# Patient Record
Sex: FEMALE | URBAN - METROPOLITAN AREA
[De-identification: names, ages, dates, MRNs, and addresses within clinical notes are randomized per-mention and may not be internally consistent; named-entity substitution may affect disease eponyms.]

---

## 2023-02-14 ENCOUNTER — TELEPHONE ENCOUNTER (OUTPATIENT)
Dept: URBAN - METROPOLITAN AREA CLINIC 113 | Facility: CLINIC | Age: 48
End: 2023-02-14

## 2023-03-13 ENCOUNTER — OFFICE VISIT (OUTPATIENT)
Dept: URBAN - METROPOLITAN AREA CLINIC 113 | Facility: CLINIC | Age: 48
End: 2023-03-13
Payer: COMMERCIAL

## 2023-03-13 VITALS
RESPIRATION RATE: 20 BRPM | SYSTOLIC BLOOD PRESSURE: 119 MMHG | TEMPERATURE: 97.7 F | HEART RATE: 77 BPM | BODY MASS INDEX: 29.3 KG/M2 | WEIGHT: 159.2 LBS | HEIGHT: 62 IN | DIASTOLIC BLOOD PRESSURE: 76 MMHG

## 2023-03-13 DIAGNOSIS — Z12.11 SCREENING FOR COLON CANCER: ICD-10-CM

## 2023-03-13 DIAGNOSIS — K59.01 SLOW TRANSIT CONSTIPATION: ICD-10-CM

## 2023-03-13 PROBLEM — 35298007 SLOW TRANSIT CONSTIPATION: Status: ACTIVE | Noted: 2023-03-13

## 2023-03-13 PROCEDURE — 99244 OFF/OP CNSLTJ NEW/EST MOD 40: CPT | Performed by: INTERNAL MEDICINE

## 2023-03-13 PROCEDURE — 99204 OFFICE O/P NEW MOD 45 MIN: CPT | Performed by: INTERNAL MEDICINE

## 2023-03-13 NOTE — HPI-TODAY'S VISIT:
46yo female referred by Dr. Ashley Olivas for evaluation of a change in bowel habits.  A copy of this document is being forwarded to the referring provider.  She complains of chronic constipation which has been worsening over the last few months. She reports no change with Benefiber, enemas, or mineral oil. MiraLAX has been helpful in the past, but for some reason she was instructed to discontinue it. Some weeks, she has a daily bowel movement. Other times, she will go several days between stools. She denies abdominal pain, nausea, vomiting, or blood per rectum. She has never had a colonoscopy. There is no family history of GI malignancy.

## 2023-04-06 ENCOUNTER — OFFICE VISIT (OUTPATIENT)
Dept: URBAN - METROPOLITAN AREA SURGERY CENTER 25 | Facility: SURGERY CENTER | Age: 48
End: 2023-04-06
Payer: COMMERCIAL

## 2023-04-06 DIAGNOSIS — Z12.11 SCREENING FOR COLON CANCER: ICD-10-CM

## 2023-04-06 PROCEDURE — 45378 DIAGNOSTIC COLONOSCOPY: CPT | Performed by: INTERNAL MEDICINE

## 2023-04-06 PROCEDURE — G8907 PT DOC NO EVENTS ON DISCHARG: HCPCS | Performed by: INTERNAL MEDICINE

## 2023-05-04 ENCOUNTER — OFFICE VISIT (OUTPATIENT)
Dept: URBAN - METROPOLITAN AREA CLINIC 113 | Facility: CLINIC | Age: 48
End: 2023-05-04
Payer: COMMERCIAL

## 2023-05-04 ENCOUNTER — DASHBOARD ENCOUNTERS (OUTPATIENT)
Age: 48
End: 2023-05-04

## 2023-05-04 VITALS
SYSTOLIC BLOOD PRESSURE: 117 MMHG | WEIGHT: 157.4 LBS | DIASTOLIC BLOOD PRESSURE: 65 MMHG | HEIGHT: 62 IN | BODY MASS INDEX: 28.97 KG/M2 | TEMPERATURE: 97.5 F | HEART RATE: 71 BPM | RESPIRATION RATE: 18 BRPM

## 2023-05-04 DIAGNOSIS — K59.01 SLOW TRANSIT CONSTIPATION: ICD-10-CM

## 2023-05-04 DIAGNOSIS — K64.8 INTERNAL HEMORRHOIDS: ICD-10-CM

## 2023-05-04 PROCEDURE — 99213 OFFICE O/P EST LOW 20 MIN: CPT | Performed by: NURSE PRACTITIONER

## 2023-05-04 NOTE — HPI-TODAY'S VISIT:
46yo female presenting for follow-up after colonoscopy. She was seen in the office in March for evaluation of chronic constipation, refractory to fiber supplementation. She was encouraged to resume MiraLAX on a daily basis given prior response. A screening colonoscopy was planned. Colonoscopy 4/6/23: nonbleeding internal hemorrhoids, otherwise normal. Her constipation has improved with an increase in hydration with MiraLAX as needed. No abdominal pain, nausea, vomiting, or blood per rectum.